# Patient Record
Sex: FEMALE | Race: WHITE | URBAN - METROPOLITAN AREA
[De-identification: names, ages, dates, MRNs, and addresses within clinical notes are randomized per-mention and may not be internally consistent; named-entity substitution may affect disease eponyms.]

---

## 2019-01-26 ENCOUNTER — HOSPITAL ENCOUNTER (EMERGENCY)
Facility: CLINIC | Age: 29
Discharge: HOME | End: 2019-01-26
Attending: FAMILY MEDICINE | Admitting: FAMILY MEDICINE

## 2019-01-26 VITALS
SYSTOLIC BLOOD PRESSURE: 103 MMHG | DIASTOLIC BLOOD PRESSURE: 69 MMHG | OXYGEN SATURATION: 97 % | WEIGHT: 151 LBS | HEIGHT: 62 IN | RESPIRATION RATE: 18 BRPM | BODY MASS INDEX: 27.79 KG/M2 | TEMPERATURE: 99 F

## 2019-01-26 DIAGNOSIS — K52.9 GASTROENTERITIS: ICD-10-CM

## 2019-01-26 LAB
ALBUMIN SERPL-MCNC: 4.4 G/DL (ref 3.4–5)
ALP SERPL-CCNC: 80 U/L (ref 40–150)
ALT SERPL W P-5'-P-CCNC: 34 U/L (ref 0–50)
ANION GAP SERPL CALCULATED.3IONS-SCNC: 13 MMOL/L (ref 3–14)
AST SERPL W P-5'-P-CCNC: 25 U/L (ref 0–45)
BASOPHILS # BLD AUTO: 0 10E9/L (ref 0–0.2)
BASOPHILS NFR BLD AUTO: 0.4 %
BILIRUB SERPL-MCNC: 0.7 MG/DL (ref 0.2–1.3)
BUN SERPL-MCNC: 13 MG/DL (ref 7–30)
CALCIUM SERPL-MCNC: 8.8 MG/DL (ref 8.5–10.1)
CHLORIDE SERPL-SCNC: 105 MMOL/L (ref 94–109)
CO2 SERPL-SCNC: 22 MMOL/L (ref 20–32)
CREAT SERPL-MCNC: 0.66 MG/DL (ref 0.52–1.04)
DIFFERENTIAL METHOD BLD: ABNORMAL
EOSINOPHIL # BLD AUTO: 0 10E9/L (ref 0–0.7)
EOSINOPHIL NFR BLD AUTO: 0.2 %
ERYTHROCYTE [DISTWIDTH] IN BLOOD BY AUTOMATED COUNT: 10.9 % (ref 10–15)
GFR SERPL CREATININE-BSD FRML MDRD: >90 ML/MIN/{1.73_M2}
GLUCOSE SERPL-MCNC: 94 MG/DL (ref 70–99)
HCT VFR BLD AUTO: 48.3 % (ref 35–47)
HGB BLD-MCNC: 16.5 G/DL (ref 11.7–15.7)
IMM GRANULOCYTES # BLD: 0 10E9/L (ref 0–0.4)
IMM GRANULOCYTES NFR BLD: 0.4 %
LYMPHOCYTES # BLD AUTO: 0.4 10E9/L (ref 0.8–5.3)
LYMPHOCYTES NFR BLD AUTO: 3.9 %
MCH RBC QN AUTO: 30.2 PG (ref 26.5–33)
MCHC RBC AUTO-ENTMCNC: 34.2 G/DL (ref 31.5–36.5)
MCV RBC AUTO: 89 FL (ref 78–100)
MONOCYTES # BLD AUTO: 0.5 10E9/L (ref 0–1.3)
MONOCYTES NFR BLD AUTO: 5.2 %
NEUTROPHILS # BLD AUTO: 8.9 10E9/L (ref 1.6–8.3)
NEUTROPHILS NFR BLD AUTO: 89.9 %
NRBC # BLD AUTO: 0 10*3/UL
NRBC BLD AUTO-RTO: 0 /100
PLATELET # BLD AUTO: 258 10E9/L (ref 150–450)
POTASSIUM SERPL-SCNC: 3.7 MMOL/L (ref 3.4–5.3)
PROT SERPL-MCNC: 8.2 G/DL (ref 6.8–8.8)
RBC # BLD AUTO: 5.46 10E12/L (ref 3.8–5.2)
SODIUM SERPL-SCNC: 140 MMOL/L (ref 133–144)
WBC # BLD AUTO: 9.8 10E9/L (ref 4–11)

## 2019-01-26 PROCEDURE — 99284 EMERGENCY DEPT VISIT MOD MDM: CPT | Mod: 25 | Performed by: FAMILY MEDICINE

## 2019-01-26 PROCEDURE — 25000128 H RX IP 250 OP 636: Performed by: FAMILY MEDICINE

## 2019-01-26 PROCEDURE — 96375 TX/PRO/DX INJ NEW DRUG ADDON: CPT | Performed by: FAMILY MEDICINE

## 2019-01-26 PROCEDURE — 96374 THER/PROPH/DIAG INJ IV PUSH: CPT | Performed by: FAMILY MEDICINE

## 2019-01-26 PROCEDURE — 96361 HYDRATE IV INFUSION ADD-ON: CPT | Performed by: FAMILY MEDICINE

## 2019-01-26 PROCEDURE — 80053 COMPREHEN METABOLIC PANEL: CPT | Performed by: FAMILY MEDICINE

## 2019-01-26 PROCEDURE — 87506 IADNA-DNA/RNA PROBE TQ 6-11: CPT | Performed by: FAMILY MEDICINE

## 2019-01-26 PROCEDURE — 99284 EMERGENCY DEPT VISIT MOD MDM: CPT | Mod: Z6 | Performed by: FAMILY MEDICINE

## 2019-01-26 PROCEDURE — 85025 COMPLETE CBC W/AUTO DIFF WBC: CPT | Performed by: FAMILY MEDICINE

## 2019-01-26 RX ORDER — PROMETHAZINE HYDROCHLORIDE 25 MG/ML
12.5 INJECTION, SOLUTION INTRAMUSCULAR; INTRAVENOUS ONCE
Status: COMPLETED | OUTPATIENT
Start: 2019-01-26 | End: 2019-01-26

## 2019-01-26 RX ORDER — PROMETHAZINE HYDROCHLORIDE 25 MG/1
25 TABLET ORAL EVERY 8 HOURS PRN
Qty: 12 TABLET | Refills: 0 | Status: SHIPPED | OUTPATIENT
Start: 2019-01-26 | End: 2019-02-25

## 2019-01-26 RX ORDER — DIPHENHYDRAMINE HYDROCHLORIDE 50 MG/ML
12.5 INJECTION INTRAMUSCULAR; INTRAVENOUS ONCE
Status: COMPLETED | OUTPATIENT
Start: 2019-01-26 | End: 2019-01-26

## 2019-01-26 RX ORDER — KETOROLAC TROMETHAMINE 15 MG/ML
15 INJECTION, SOLUTION INTRAMUSCULAR; INTRAVENOUS ONCE
Status: COMPLETED | OUTPATIENT
Start: 2019-01-26 | End: 2019-01-26

## 2019-01-26 RX ORDER — ONDANSETRON 4 MG/1
4-8 TABLET, ORALLY DISINTEGRATING ORAL EVERY 8 HOURS PRN
Qty: 12 TABLET | Refills: 0 | Status: SHIPPED | OUTPATIENT
Start: 2019-01-26 | End: 2019-01-26

## 2019-01-26 RX ADMIN — SODIUM CHLORIDE 1000 ML: 9 INJECTION, SOLUTION INTRAVENOUS at 21:16

## 2019-01-26 RX ADMIN — SODIUM CHLORIDE 2000 ML: 9 INJECTION, SOLUTION INTRAVENOUS at 19:10

## 2019-01-26 RX ADMIN — PROMETHAZINE HYDROCHLORIDE 12.5 MG: 25 INJECTION INTRAMUSCULAR; INTRAVENOUS at 19:15

## 2019-01-26 RX ADMIN — KETOROLAC TROMETHAMINE 15 MG: 15 INJECTION, SOLUTION INTRAMUSCULAR; INTRAVENOUS at 19:20

## 2019-01-26 RX ADMIN — DIPHENHYDRAMINE HYDROCHLORIDE 12.5 MG: 50 INJECTION, SOLUTION INTRAMUSCULAR; INTRAVENOUS at 19:20

## 2019-01-26 ASSESSMENT — MIFFLIN-ST. JEOR: SCORE: 1378.18

## 2019-01-27 ENCOUNTER — TELEPHONE (OUTPATIENT)
Dept: EMERGENCY MEDICINE | Facility: CLINIC | Age: 29
End: 2019-01-27

## 2019-01-27 LAB
C COLI+JEJUNI+LARI FUSA STL QL NAA+PROBE: NOT DETECTED
EC STX1 GENE STL QL NAA+PROBE: NOT DETECTED
EC STX2 GENE STL QL NAA+PROBE: NOT DETECTED
ENTERIC PATHOGEN COMMENT: ABNORMAL
NOROV GI+II ORF1-ORF2 JNC STL QL NAA+PR: ABNORMAL
RVA NSP5 STL QL NAA+PROBE: NOT DETECTED
SALMONELLA SP RPOD STL QL NAA+PROBE: NOT DETECTED
SHIGELLA SP+EIEC IPAH STL QL NAA+PROBE: NOT DETECTED
V CHOL+PARA RFBL+TRKH+TNAA STL QL NAA+PR: NOT DETECTED
Y ENTERO RECN STL QL NAA+PROBE: NOT DETECTED

## 2019-01-27 NOTE — ED PROVIDER NOTES
"HPI  Current medications, past medical history, and social history are reviewed.    Patient is a 28-year-old female presenting with nausea, vomiting, and diarrhea.  She has had symptoms since about noon today when she ate lunch.  She has thrown up and had diarrhea \"too many times to count.\"  She recognized some blood in the stool the last couple of times.  No melena.  No mucus in the stool.  She has had sweats and chills at home.  She has had abdominal cramping that waxes and wanes.  She has been lightheaded and very fatigued.  Earlier in the week she describes upper respiratory symptoms including a fever.  She works at a  and multiple kids have left with GI symptoms recently.  Patient denies any recent antibiotics.  No recent travel.  No obviously tainted food.    ROS: All other review of systems are negative other than that noted above.     History reviewed. No pertinent past medical history.  History reviewed. No pertinent surgical history.  Medicines    promethazine (PHENERGAN) 25 MG tablet   IBUPROFEN PO   Norethindrone (SOHAIL PO)   Omega-3 Fatty Acids (OMEGA-3 FISH OIL PO)   SUMAtriptan (IMITREX) 50 MG tablet     No family history on file.  Social History     Tobacco Use     Smoking status: Passive Smoke Exposure - Never Smoker     Smokeless tobacco: Never Used   Substance Use Topics     Alcohol use: Yes     Comment: 1x a month     Drug use: No         PHYSICAL  /69   Temp 99  F (37.2  C) (Temporal)   Resp 18   Ht 1.575 m (5' 2\")   Wt 68.5 kg (151 lb)   SpO2 97%   BMI 27.62 kg/m    General: Patient is alert and in moderate to severe distress.  Tired appearing.  Slightly disheveled.  Operative and smiling.  Pleasant.  Neurological: Alert.  Moving upper and lower extremities equally, bilaterally.  Head / Neck: Atraumatic.  Ears: Not done.  Eyes: Pupils are equal, round, and reactive.  Normal conjunctiva.  Nose: Midline.  No epistaxis.  Mouth / Throat: No ulcerations or lesions.  Upper pharynx " is not erythematous.  Moist.  Respiratory: No respiratory distress. CTA B.  Cardiovascular: Regular rhythm.  Peripheral extremities are warm.  No edema.  No calf tenderness.  Abdomen / Pelvis: Moderate tenderness throughout.  No distention.  Soft throughout.  Genitalia: Not done.  Musculoskeletal: No tenderness over major muscles and joints.  Skin: No evidence of rash or trauma.        ED COURSE  1903.  The patient presents with nausea, vomiting, and diarrhea.  Lab values pending.  Phenergan, Benadryl, fluid bolus ordered.  Toradol.    Labs Ordered and Resulted from Time of ED Arrival Up to the Time of Departure from the ED   CBC WITH PLATELETS DIFFERENTIAL - Abnormal; Notable for the following components:       Result Value    RBC Count 5.46 (*)     Hemoglobin 16.5 (*)     Hematocrit 48.3 (*)     Absolute Neutrophil 8.9 (*)     Absolute Lymphocytes 0.4 (*)     All other components within normal limits   COMPREHENSIVE METABOLIC PANEL   ENTERIC BACTERIA AND VIRUS PANEL BY REGINALD STOOL     2145.  Patient is much improved.  Lab values are unremarkable.  Dehydration suggested with the concentrated hemoglobin.  Follow up discussed.  She was unable to provide a stool sample here for study.  No antibiotics at this time.  Phenergan prescription provided.    Medications   0.9% sodium chloride BOLUS (0 mLs Intravenous Stopped 1/26/19 2115)   ketorolac (TORADOL) injection 15 mg (15 mg Intravenous Given 1/26/19 1920)   promethazine (PHENERGAN) IV injection 12.5 mg (12.5 mg Intravenous Given 1/26/19 1915)   diphenhydrAMINE (BENADRYL) injection 12.5 mg (12.5 mg Intravenous Given 1/26/19 1920)   0.9% sodium chloride BOLUS (1,000 mLs Intravenous New Bag 1/26/19 2116)       IMPRESSION    ICD-10-CM    1. Gastroenteritis K52.9          Critical Care time:  none                    Steven Ku MD  01/26/19 2146

## 2019-01-27 NOTE — DISCHARGE INSTRUCTIONS
Return to the Emergency Room if the following occurs:     Severely worsened pain, dehydration, or for any concern at anytime.    Or, follow-up with the following provider as we discussed:     Return to your primary doctor as needed, or if not improved over the next 7 days.    Medications discussed:    Ibuprofen 600 mg every six hours for pain (7 days duration).  Tylenol 1000 mg every six hours for pain (7 days duration).  Therefore, you can alternate these every three hours and do it safely.  Phenergan for nausea, as needed.    If you received pain-relieving or sedating medication during your time in the ER, avoid alcohol, driving automobiles, or working with machinery.  Also, a responsible adult must stay with you.        Call the Nurse Advice Line at [phone] or [phone] for any concern at anytime.

## 2019-01-27 NOTE — TELEPHONE ENCOUNTER
"[facility] Department Lab result notification:    [place] ED lab result protocol used  Norovirus I and II    Reason for call  Notify of lab results, assess symptoms,  review ED providers recommendations/discharge instructions (if necessary) and advise per ED lab result f/u protocol    Lab Result  Final Enteric Bacteria and Virus Panel by REGINALD Stool is POSITIVE for Norovirus I and II by REGINALD  Patient to be notified, symptom's assessed and advised per [place] ED lab result f/u protocol    Symptoms reported at ED visit (Chief complaint, HPI) Current medications, past medical history, and social history are reviewed.     Patient is a 28-year-old female presenting with nausea, vomiting, and diarrhea.  She has had symptoms since about noon today when she ate lunch.  She has thrown up and had diarrhea \"too many times to count.\"  She recognized some blood in the stool the last couple of times.  No melena.  No mucus in the stool.  She has had sweats and chills at home.  She has had abdominal cramping that waxes and wanes.  She has been lightheaded and very fatigued.  Earlier in the week she describes upper respiratory symptoms including a fever.  She works at a  and multiple kids have left with GI symptoms recently.  Patient denies any recent antibiotics.  No recent travel.  No obviously tainted food.   ED providers Impression and Plan (applicable information) Patient is much improved.  Lab values are unremarkable.  Dehydration suggested with the concentrated hemoglobin.  Follow up discussed.  She was unable to provide a stool sample here for study.  No antibiotics at this time.  Phenergan prescription provided.   Miscellaneous information NA     RN Assessment (Patient s current Symptoms), include time called.  [Insert Left message here if message left]  5:33PM: Spoke with Patient. She states that she is feeling better than yesterday. Still having diarrhea stools, but less often. Is not " vomiting. Has been able to eat and drink small amounts. Has been urinating normally. Denies fever.   RN Recommendations/Instructions per [place] ED lab result protocol  Patient notified of lab result and treatment recommendation. Reviewed Norovirus information from Patient education in protocol. Advised to follow up with PCP as directed by the ED provider.      Please Contact your PCP clinic or return to the Emergency department if your:.    Symptoms worsen or other concerning symptom's.    PCP follow-up Questions asked: YES       [RN Name]  Arleth Boyce RN  [facility] RN  Lung Nodule and ED Lab Result RN  Epic pool (ED late result f/u RN): P [id number]  [facility] INCIDENTAL RADIOLOGY F/U NURSES: P [id number]  # [phone]      Copy of Lab result   Enteric Bacteria and Virus Panel by REGINALD Stool [[id number]] (Order [id number])   Exam Information     Exam Date Exam Time Accession # Results    1/26/19 10:14 PM     Specimen Information: Feces        Component Value Flag Ref Range Units Status Collected Lab   Campylobacter group by REGINALD Not Detected   NDET^Not Detected  Final 01/26/2019 10:14 PM 75   Salmonella species by REGINALD Not Detected   NDET^Not Detected  Final 01/26/2019 10:14 PM 75   Shigella species by REGINALD Not Detected   NDET^Not Detected  Final 01/26/2019 10:14 PM 75   Vibrio group by REGINALD Not Detected   NDET^Not Detected  Final 01/26/2019 10:14 PM 75   Rotavirus A by REGINALD Not Detected   NDET^Not Detected  Final 01/26/2019 10:14 PM 75   Shiga toxin 1 gene by REGINALD Not Detected   NDET^Not Detected  Final 01/26/2019 10:14 PM 75   Shiga toxin 2 gene by REGINALD Not Detected   NDET^Not Detected  Final 01/26/2019 10:14 PM 75   Norovirus I and II by REGINALD (Abnormal)   NDET^Not Detected  Final 01/26/2019 10:14 PM 75   Detected, Abnormal Result Abnormal     Yersinia enterocolitica by REGINALD Not Detected   NDET^Not Detected  Final 01/26/2019 10:14 PM 75   Enteric pathogen comment     Final 01/26/2019 10:14 PM 75   Testing  performed by multiplexed, qualitative PCR using the [employer] Enteric   Pathogens Nucleic Acid Test. Results should not be used as the sole basis for diagnosis,   treatment, or other patient management decisions.    Comment:   Positive results do not rule out co-infection with other organisms that are   not detected by this test, and may not be the sole or definitive cause of   patient illness.   Negative results in the setting of clinical illness compatible with   gastroenteritis may be due to infection by pathogens that are not detected by   this test or non-infectious causes such as ulcerative colitis, irritable bowel    syndrome, or Crohn's disease.   Note: Shiga toxin producing E. coli (STEC) typically harbor one or both genes   that encode for Shiga toxins 1 and 2.

## 2023-09-22 NOTE — ED AVS SNAPSHOT
[facility] Emergency Department  [address] [address]  Phone:  [phone]  Fax:  [phone]                                    Monica De nAda   MRN: [id number]    Department:  [facility] Emergency Department   Date of Visit:  1/26/2019           After Visit Summary Signature Page    I have received my discharge instructions, and my questions have been answered. I have discussed any challenges I see with this plan with the nurse or doctor.    ..........................................................................................................................................  Patient/Patient Representative Signature      ..........................................................................................................................................  Patient Representative Print Name and Relationship to Patient    ..................................................               ................................................  Date                                   Time    ..........................................................................................................................................  Reviewed by Signature/Title    ...................................................              ..............................................  Date                                               Time          22EPIC Rev 08/18       
72